# Patient Record
Sex: FEMALE | Race: OTHER | Employment: UNEMPLOYED | URBAN - METROPOLITAN AREA
[De-identification: names, ages, dates, MRNs, and addresses within clinical notes are randomized per-mention and may not be internally consistent; named-entity substitution may affect disease eponyms.]

---

## 2018-03-27 ENCOUNTER — APPOINTMENT (OUTPATIENT)
Dept: GENERAL RADIOLOGY | Age: 48
End: 2018-03-27
Attending: EMERGENCY MEDICINE
Payer: SELF-PAY

## 2018-03-27 ENCOUNTER — APPOINTMENT (OUTPATIENT)
Dept: CT IMAGING | Age: 48
End: 2018-03-27
Attending: EMERGENCY MEDICINE
Payer: SELF-PAY

## 2018-03-27 ENCOUNTER — HOSPITAL ENCOUNTER (EMERGENCY)
Age: 48
Discharge: HOME | End: 2018-03-27
Attending: EMERGENCY MEDICINE
Payer: SELF-PAY

## 2018-03-27 VITALS
HEART RATE: 95 BPM | WEIGHT: 153.88 LBS | OXYGEN SATURATION: 95 % | HEIGHT: 63 IN | RESPIRATION RATE: 14 BRPM | SYSTOLIC BLOOD PRESSURE: 114 MMHG | DIASTOLIC BLOOD PRESSURE: 76 MMHG | TEMPERATURE: 97.8 F | BODY MASS INDEX: 27.27 KG/M2

## 2018-03-27 DIAGNOSIS — R51.9 NONINTRACTABLE HEADACHE, UNSPECIFIED CHRONICITY PATTERN, UNSPECIFIED HEADACHE TYPE: ICD-10-CM

## 2018-03-27 DIAGNOSIS — R10.30 LOWER ABDOMINAL PAIN: ICD-10-CM

## 2018-03-27 DIAGNOSIS — R19.7 DIARRHEA, UNSPECIFIED TYPE: ICD-10-CM

## 2018-03-27 DIAGNOSIS — R11.2 NON-INTRACTABLE VOMITING WITH NAUSEA, UNSPECIFIED VOMITING TYPE: ICD-10-CM

## 2018-03-27 DIAGNOSIS — R07.9 ACUTE CHEST PAIN: Primary | ICD-10-CM

## 2018-03-27 LAB
ALBUMIN SERPL-MCNC: 3.9 G/DL (ref 3.5–5)
ALBUMIN/GLOB SERPL: 1 {RATIO} (ref 1.1–2.2)
ALP SERPL-CCNC: 63 U/L (ref 45–117)
ALT SERPL-CCNC: 39 U/L (ref 12–78)
ANION GAP SERPL CALC-SCNC: 10 MMOL/L (ref 5–15)
APPEARANCE UR: CLEAR
AST SERPL-CCNC: 24 U/L (ref 15–37)
BACTERIA URNS QL MICRO: NEGATIVE /HPF
BASOPHILS # BLD: 0 K/UL (ref 0–0.1)
BASOPHILS NFR BLD: 0 % (ref 0–1)
BILIRUB SERPL-MCNC: 0.7 MG/DL (ref 0.2–1)
BILIRUB UR QL: NEGATIVE
BUN SERPL-MCNC: 13 MG/DL (ref 6–20)
BUN/CREAT SERPL: 16 (ref 12–20)
CALCIUM SERPL-MCNC: 9.1 MG/DL (ref 8.5–10.1)
CHLORIDE SERPL-SCNC: 105 MMOL/L (ref 97–108)
CO2 SERPL-SCNC: 27 MMOL/L (ref 21–32)
COLOR UR: ABNORMAL
CREAT SERPL-MCNC: 0.81 MG/DL (ref 0.55–1.02)
DIFFERENTIAL METHOD BLD: ABNORMAL
EOSINOPHIL # BLD: 0 K/UL (ref 0–0.4)
EOSINOPHIL NFR BLD: 0 % (ref 0–7)
EPITH CASTS URNS QL MICRO: ABNORMAL /LPF
ERYTHROCYTE [DISTWIDTH] IN BLOOD BY AUTOMATED COUNT: 13.4 % (ref 11.5–14.5)
GLOBULIN SER CALC-MCNC: 4.1 G/DL (ref 2–4)
GLUCOSE SERPL-MCNC: 117 MG/DL (ref 65–100)
GLUCOSE UR STRIP.AUTO-MCNC: NEGATIVE MG/DL
HCG UR QL: NEGATIVE
HCT VFR BLD AUTO: 46.1 % (ref 35–47)
HGB BLD-MCNC: 15.8 G/DL (ref 11.5–16)
HGB UR QL STRIP: NEGATIVE
HYALINE CASTS URNS QL MICRO: ABNORMAL /LPF (ref 0–5)
IMM GRANULOCYTES # BLD: 0 K/UL (ref 0–0.04)
IMM GRANULOCYTES NFR BLD AUTO: 0 % (ref 0–0.5)
KETONES UR QL STRIP.AUTO: NEGATIVE MG/DL
LEUKOCYTE ESTERASE UR QL STRIP.AUTO: NEGATIVE
LIPASE SERPL-CCNC: 193 U/L (ref 73–393)
LYMPHOCYTES # BLD: 1.9 K/UL (ref 0.8–3.5)
LYMPHOCYTES NFR BLD: 20 % (ref 12–49)
MCH RBC QN AUTO: 29.2 PG (ref 26–34)
MCHC RBC AUTO-ENTMCNC: 34.3 G/DL (ref 30–36.5)
MCV RBC AUTO: 85.1 FL (ref 80–99)
MONOCYTES # BLD: 0.5 K/UL (ref 0–1)
MONOCYTES NFR BLD: 5 % (ref 5–13)
NEUTS SEG # BLD: 7.2 K/UL (ref 1.8–8)
NEUTS SEG NFR BLD: 74 % (ref 32–75)
NITRITE UR QL STRIP.AUTO: NEGATIVE
NRBC # BLD: 0 K/UL (ref 0–0.01)
NRBC BLD-RTO: 0 PER 100 WBC
PH UR STRIP: 7.5 [PH] (ref 5–8)
PLATELET # BLD AUTO: 345 K/UL (ref 150–400)
PMV BLD AUTO: 8.7 FL (ref 8.9–12.9)
POTASSIUM SERPL-SCNC: 3.7 MMOL/L (ref 3.5–5.1)
PROT SERPL-MCNC: 8 G/DL (ref 6.4–8.2)
PROT UR STRIP-MCNC: NEGATIVE MG/DL
RBC # BLD AUTO: 5.42 M/UL (ref 3.8–5.2)
RBC #/AREA URNS HPF: ABNORMAL /HPF (ref 0–5)
SODIUM SERPL-SCNC: 142 MMOL/L (ref 136–145)
SP GR UR REFRACTOMETRY: 1 (ref 1–1.03)
TROPONIN I SERPL-MCNC: <0.04 NG/ML
UA: UC IF INDICATED,UAUC: ABNORMAL
UROBILINOGEN UR QL STRIP.AUTO: 0.2 EU/DL (ref 0.2–1)
WBC # BLD AUTO: 9.7 K/UL (ref 3.6–11)
WBC URNS QL MICRO: ABNORMAL /HPF (ref 0–4)

## 2018-03-27 PROCEDURE — 85025 COMPLETE CBC W/AUTO DIFF WBC: CPT | Performed by: EMERGENCY MEDICINE

## 2018-03-27 PROCEDURE — 93005 ELECTROCARDIOGRAM TRACING: CPT

## 2018-03-27 PROCEDURE — 84484 ASSAY OF TROPONIN QUANT: CPT | Performed by: EMERGENCY MEDICINE

## 2018-03-27 PROCEDURE — 99284 EMERGENCY DEPT VISIT MOD MDM: CPT

## 2018-03-27 PROCEDURE — 74011636320 HC RX REV CODE- 636/320: Performed by: RADIOLOGY

## 2018-03-27 PROCEDURE — 36415 COLL VENOUS BLD VENIPUNCTURE: CPT | Performed by: EMERGENCY MEDICINE

## 2018-03-27 PROCEDURE — 71046 X-RAY EXAM CHEST 2 VIEWS: CPT

## 2018-03-27 PROCEDURE — 80053 COMPREHEN METABOLIC PANEL: CPT | Performed by: EMERGENCY MEDICINE

## 2018-03-27 PROCEDURE — 71275 CT ANGIOGRAPHY CHEST: CPT

## 2018-03-27 PROCEDURE — 74177 CT ABD & PELVIS W/CONTRAST: CPT

## 2018-03-27 PROCEDURE — 81025 URINE PREGNANCY TEST: CPT

## 2018-03-27 PROCEDURE — 83690 ASSAY OF LIPASE: CPT | Performed by: EMERGENCY MEDICINE

## 2018-03-27 PROCEDURE — 70450 CT HEAD/BRAIN W/O DYE: CPT

## 2018-03-27 PROCEDURE — 81001 URINALYSIS AUTO W/SCOPE: CPT | Performed by: EMERGENCY MEDICINE

## 2018-03-27 RX ADMIN — IOPAMIDOL 100 ML: 755 INJECTION, SOLUTION INTRAVENOUS at 16:43

## 2018-03-27 NOTE — ED PROVIDER NOTES
HPI Comments: 50 y.o. female with no significant past medical history who presents from home with chief complaint of CP. The pt c/o intermittent sharp CP rated at 8/10 that radiates to her back that has persisted over the last 3-4 days. The pt also c/o posterior HA. The pt has also had nausea and has had 2 episodes of vomiting this morning. The pt has been taking tylenol for the pain and has had mild relief. The pt reports that the pain is current in the ED. LMP was 2 months ago. The pt denies hx of HTN, high cholesterol, recent heavy lifting, and dysuria. There are no other acute medical concerns at this time. Significant FMHx: Mother had MI at 79  PCP: None    Note written by Francy Sims, as dictated by Eryn Ortega MD 3:37 PM      The history is provided by the patient. No  was used. No past medical history on file. No past surgical history on file. No family history on file. Social History     Social History    Marital status: SINGLE     Spouse name: N/A    Number of children: N/A    Years of education: N/A     Occupational History    Not on file. Social History Main Topics    Smoking status: Not on file    Smokeless tobacco: Not on file    Alcohol use Not on file    Drug use: Not on file    Sexual activity: Not on file     Other Topics Concern    Not on file     Social History Narrative         ALLERGIES: Review of patient's allergies indicates no known allergies. Review of Systems   Cardiovascular: Positive for chest pain. Gastrointestinal: Positive for nausea and vomiting. Genitourinary: Negative for dysuria. Neurological: Positive for headaches. All other systems reviewed and are negative. Vitals:    03/27/18 1434   BP: 160/87   Pulse: 95   Resp: 15   Temp: 97.8 °F (36.6 °C)   SpO2: 98%   Weight: 69.8 kg (153 lb 14.1 oz)   Height: 5' 3\" (1.6 m)            Physical Exam   Nursing note and vitals reviewed.    Nursing note and vitals reviewed. Constitutional: appears well-developed and well-nourished. No distress. HENT:   Head: Normocephalic and atraumatic. Sclera anicteric  Nose: No rhinorrhea  Mouth/Throat: Oropharynx is clear and moist. Pharynx normal  Eyes: Conjunctivae are normal. Pupils are equal, round, and reactive to light. Right eye exhibits no discharge. Left eye exhibits no discharge. No scleral icterus. Neck: Painless normal range of motion. Supple  Cardiovascular: Normal rate, regular rhythm, normal heart sounds and intact distal pulses. Exam reveals no gallop and no friction rub. No murmur heard. Pulmonary/Chest: Effort normal and breath sounds normal. No respiratory distress. no wheezes. no rales. Abdominal: Soft. Bowel sounds are normal. Exhibits no distension and no mass. mild suprapubic tenderness. No guarding. Musculoskeletal: Normal range of motion. no tenderness. No edema. Mid sternal tenderness  Lymphadenopathy:   No cervical adenopathy. Neurological:  Alert and oriented to person, place, and time. Coordination normal. CN 2-12 intact. Moving all extremities. Skin: Skin is warm and dry. No rash noted. No pallor. Note written by Francy King, as dictated by True Noble MD 4:15 PM        MDM    61-year-old female with multiple complaints including headache, chest pain, nausea vomiting, lower abdominal pain.  Patient denies any urinary complaints.  Neurologically intact. Hernando Giovani tender suprapubically.  Does have reproducible chest pain on the midsternum. Differential diagnosis is broad including intracranial hemorrhage, musculoskeletal chest pain, MI, UTI, intra-abdominal process and others.  Will check chest x-ray, CT head, lab work. ED Course       Procedures   ED EKG interpretation:  Rhythm: normal sinus rhythm; and regular . Rate (approx.): 93; Axis: normal; ST/T wave: no ST/T wave changes;    Note written by Francy King, as dictated by Aidan Whitten MD 4:14 PM    [id number]    Patient's results have been reviewed with them. CP RESOLVED AND PATIENT IS FEELING BETTER. Patient and/or family have verbally conveyed their understanding and agreement of the patient's signs, symptoms, diagnosis, treatment and prognosis and additionally agree to follow up as recommended or return to the Emergency Room should their condition change prior to follow-up. Discharge instructions have also been provided to the patient with some educational information regarding their diagnosis as well a list of reasons why they would want to return to the ER prior to their follow-up appointment should their condition change.     Recent Results (from the past 24 hour(s))   EKG, 12 LEAD, INITIAL    Collection Time: 03/27/18  2:30 PM   Result Value Ref Range    Ventricular Rate 93 BPM    Atrial Rate 93 BPM    P-R Interval 136 ms    QRS Duration 76 ms    Q-T Interval 356 ms    QTC Calculation (Bezet) 442 ms    Calculated P Axis 47 degrees    Calculated R Axis -8 degrees    Calculated T Axis 27 degrees    Diagnosis       Normal sinus rhythm  Low voltage QRS  Borderline ECG  No previous ECGs available     URINALYSIS W/ REFLEX CULTURE    Collection Time: 03/27/18  3:23 PM   Result Value Ref Range    Color YELLOW/STRAW      Appearance CLEAR CLEAR      Specific gravity 1.005 1.003 - 1.030      pH (UA) 7.5 5.0 - 8.0      Protein NEGATIVE  NEG mg/dL    Glucose NEGATIVE  NEG mg/dL    Ketone NEGATIVE  NEG mg/dL    Bilirubin NEGATIVE  NEG      Blood NEGATIVE  NEG      Urobilinogen 0.2 0.2 - 1.0 EU/dL    Nitrites NEGATIVE  NEG      Leukocyte Esterase NEGATIVE  NEG      WBC 0-4 0 - 4 /hpf    RBC 0-5 0 - 5 /hpf    Epithelial cells MODERATE (A) FEW /lpf    Bacteria NEGATIVE  NEG /hpf    UA:UC IF INDICATED CULTURE NOT INDICATED BY UA RESULT CNI      Hyaline cast 0-2 0 - 5 /lpf   CBC WITH AUTOMATED DIFF    Collection Time: 03/27/18  4:33 PM   Result Value Ref Range    WBC 9.7 3.6 - 11.0 K/uL RBC 5.42 (H) 3.80 - 5.20 M/uL    HGB 15.8 11.5 - 16.0 g/dL    HCT 46.1 35.0 - 47.0 %    MCV 85.1 80.0 - 99.0 FL    MCH 29.2 26.0 - 34.0 PG    MCHC 34.3 30.0 - 36.5 g/dL    RDW 13.4 11.5 - 14.5 %    PLATELET 993 968 - 586 K/uL    MPV 8.7 (L) 8.9 - 12.9 FL    NRBC 0.0 0  WBC    ABSOLUTE NRBC 0.00 0.00 - 0.01 K/uL    NEUTROPHILS 74 32 - 75 %    LYMPHOCYTES 20 12 - 49 %    MONOCYTES 5 5 - 13 %    EOSINOPHILS 0 0 - 7 %    BASOPHILS 0 0 - 1 %    IMMATURE GRANULOCYTES 0 0.0 - 0.5 %    ABS. NEUTROPHILS 7.2 1.8 - 8.0 K/UL    ABS. LYMPHOCYTES 1.9 0.8 - 3.5 K/UL    ABS. MONOCYTES 0.5 0.0 - 1.0 K/UL    ABS. EOSINOPHILS 0.0 0.0 - 0.4 K/UL    ABS. BASOPHILS 0.0 0.0 - 0.1 K/UL    ABS. IMM. GRANS. 0.0 0.00 - 0.04 K/UL    DF AUTOMATED     TROPONIN I    Collection Time: 03/27/18  4:33 PM   Result Value Ref Range    Troponin-I, Qt. <0.04 <7.30 ng/mL   METABOLIC PANEL, COMPREHENSIVE    Collection Time: 03/27/18  4:33 PM   Result Value Ref Range    Sodium 142 136 - 145 mmol/L    Potassium 3.7 3.5 - 5.1 mmol/L    Chloride 105 97 - 108 mmol/L    CO2 27 21 - 32 mmol/L    Anion gap 10 5 - 15 mmol/L    Glucose 117 (H) 65 - 100 mg/dL    BUN 13 6 - 20 MG/DL    Creatinine 0.81 0.55 - 1.02 MG/DL    BUN/Creatinine ratio 16 12 - 20      GFR est AA >60 >60 ml/min/1.73m2    GFR est non-AA >60 >60 ml/min/1.73m2    Calcium 9.1 8.5 - 10.1 MG/DL    Bilirubin, total 0.7 0.2 - 1.0 MG/DL    ALT (SGPT) 39 12 - 78 U/L    AST (SGOT) 24 15 - 37 U/L    Alk.  phosphatase 63 45 - 117 U/L    Protein, total 8.0 6.4 - 8.2 g/dL    Albumin 3.9 3.5 - 5.0 g/dL    Globulin 4.1 (H) 2.0 - 4.0 g/dL    A-G Ratio 1.0 (L) 1.1 - 2.2     LIPASE    Collection Time: 03/27/18  4:33 PM   Result Value Ref Range    Lipase 193 73 - 393 U/L   HCG URINE, QL. - POC    Collection Time: 03/27/18  6:05 PM   Result Value Ref Range    Pregnancy test,urine (POC) NEGATIVE  NEG         Xr Chest Pa Lat    Result Date: 3/27/2018  Exam:  2 view chest Indication: Chest pain, midsternal for the past 3 days. COMPARISON: None PA and lateral views demonstrate normal heart size. There is no acute process in the lung fields. There is a calcified granuloma in the right upper lobe. The osseous structures are unremarkable. Impression: No acute process. Ct Head Wo Cont    Result Date: 3/27/2018  EXAM:  CT HEAD WO CONT INDICATION:   Headache with nausea and vomiting x3 days. COMPARISON: None. CONTRAST:  None. TECHNIQUE: Unenhanced CT of the head was performed using 5 mm images. Brain and bone windows were generated. CT dose reduction was achieved through use of a standardized protocol tailored for this examination and automatic exposure control for dose modulation. FINDINGS: The ventricles and sulci are normal in size, shape and configuration and midline. There is no significant white matter disease. There is no intracranial hemorrhage, extra-axial collection, mass, mass effect or midline shift. The basilar cisterns are open. No acute infarct is identified. The bone windows demonstrate no abnormalities. The visualized portions of the paranasal sinuses and mastoid air cells are clear. IMPRESSION: Normal.    Cta Chest W Or W Wo Cont    Result Date: 3/27/2018  INDICATION: Midsternal chest pain and abdominal pain with nausea, vomiting, and headache x3-4 days. COMPARISON: None TECHNIQUE:  2.5 mm axial images were obtained from the bases to the lung apices after the intravenous administration of 100 cc of Isovue-370. Three-dimensional postprocessing was performed by the technologist with MIP reconstructions. Portal venous imaging was obtained through the abdomen and pelvis with sagittal and coronal reformats. Subsequent portal venous imaging of the abdomen and pelvis was performed with sagittal and coronal reformats. Oral contrast Was not administered CT dose reduction was achieved through use of a standardized protocol tailored for this examination and automatic exposure control for dose modulation. FINDINGS: THYROID: No nodule. MEDIASTINUM: No mass or lymphadenopathy. FLORENCIO: No mass or lymphadenopathy. THORACIC AORTA: No dissection or aneurysm. MAIN PULMONARY ARTERY: No acute pulmonary embolus. TRACHEA/BRONCHI: Patent. ESOPHAGUS: No wall thickening or dilatation. HEART: Normal in size. PLEURA: No effusion or pneumothorax. LUNGS: Calcified granuloma right upper lobe. Otherwise clear. LIVER: Diffusely hypodense with sparing about the gallbladder fossa. No focal lesion or biliary ductal dilation. GALLBLADDER: Unremarkable. SPLEEN: No mass. PANCREAS: No mass or ductal dilatation. ADRENALS: Unremarkable. KIDNEYS: No mass, calculus, or hydronephrosis. STOMACH: Unremarkable. SMALL BOWEL: No dilatation or wall thickening. COLON: No dilatation or wall thickening. APPENDIX: Unremarkable. PERITONEUM: No ascites or pneumoperitoneum. RETROPERITONEUM: No lymphadenopathy or aortic aneurysm. REPRODUCTIVE ORGANS: Uterus and ovaries appear unremarkable. URINARY BLADDER: No mass or calculus. BONES: No destructive bone lesion. ADDITIONAL COMMENTS: N/A     IMPRESSION: 1. No acute cardiopulmonary disease. No acute pulmonary embolus. 2. No acute abnormality of the abdomen or pelvis. 3. Hepatic steatosis and additional incidental findings as above. Ct Abd Pelv W Cont    Result Date: 3/27/2018  INDICATION: Midsternal chest pain and abdominal pain with nausea, vomiting, and headache x3-4 days. COMPARISON: None TECHNIQUE:  2.5 mm axial images were obtained from the bases to the lung apices after the intravenous administration of 100 cc of Isovue-370. Three-dimensional postprocessing was performed by the technologist with MIP reconstructions. Portal venous imaging was obtained through the abdomen and pelvis with sagittal and coronal reformats. Subsequent portal venous imaging of the abdomen and pelvis was performed with sagittal and coronal reformats.  Oral contrast Was not administered CT dose reduction was achieved through use of a standardized protocol tailored for this examination and automatic exposure control for dose modulation. FINDINGS: THYROID: No nodule. MEDIASTINUM: No mass or lymphadenopathy. FLORENCIO: No mass or lymphadenopathy. THORACIC AORTA: No dissection or aneurysm. MAIN PULMONARY ARTERY: No acute pulmonary embolus. TRACHEA/BRONCHI: Patent. ESOPHAGUS: No wall thickening or dilatation. HEART: Normal in size. PLEURA: No effusion or pneumothorax. LUNGS: Calcified granuloma right upper lobe. Otherwise clear. LIVER: Diffusely hypodense with sparing about the gallbladder fossa. No focal lesion or biliary ductal dilation. GALLBLADDER: Unremarkable. SPLEEN: No mass. PANCREAS: No mass or ductal dilatation. ADRENALS: Unremarkable. KIDNEYS: No mass, calculus, or hydronephrosis. STOMACH: Unremarkable. SMALL BOWEL: No dilatation or wall thickening. COLON: No dilatation or wall thickening. APPENDIX: Unremarkable. PERITONEUM: No ascites or pneumoperitoneum. RETROPERITONEUM: No lymphadenopathy or aortic aneurysm. REPRODUCTIVE ORGANS: Uterus and ovaries appear unremarkable. URINARY BLADDER: No mass or calculus. BONES: No destructive bone lesion. ADDITIONAL COMMENTS: N/A     IMPRESSION: 1. No acute cardiopulmonary disease. No acute pulmonary embolus. 2. No acute abnormality of the abdomen or pelvis. 3. Hepatic steatosis and additional incidental findings as above.

## 2018-03-27 NOTE — ED NOTES
Pt discharged by provider. Pt ambulatory off the unit with a steady gait with family and in NAD. Pt declined using a w/c. Home with family who will be driving.

## 2018-03-27 NOTE — ED TRIAGE NOTES
Pt. C/o pain to midsternal chest for the past 3 days. C/o general abdominal pain for the past 4 days with n/v and headache for 3 days.

## 2018-03-27 NOTE — DISCHARGE INSTRUCTIONS
Dolor abdominal: Instrucciones de cuidado - [ Abdominal Pain: Care Instructions ]  Instrucciones de cuidado    El dolor abdominal tiene muchas causas posibles. Algunas de ellas no son graves y mejoran por sí solas en unos días. Otras requieren [place] y [place]. Si vasquez dolor continúa o [place], necesitará francisca nueva revisión y [place] pruebas para determinar qué pasa. Es posible que necesite cirugía para corregir el problema. No ignore nuevos síntomas, [place] fiebre, náuseas y [place], [facility], dolor que [place] o [place]. Podrían ser señales de un problema más grave. Vasquez médico puede haberle recomendado francisca consulta de [employer] las 8 o 12 horas siguientes. Si no se siente mejor, es posible que requiera [place] o [place]. El médico lo douglas revisado minuciosamente, jeffery puede garth problemas más tarde. Si nota algún problema o síntomas nuevos, busque tratamiento médico inmediatamente. La atención de seguimiento es francisca parte clave de vasquez tratamiento y seguridad. Asegúrese de hacer y acudir a todas las citas, y llame a vasquez médico si está teniendo problemas. También es francisca buena idea saber los resultados de los exámenes y mantener francisca lista de los medicamentos que juan j. ¿Cómo puede cuidarse en el hogar? · Descanse hasta que se sienta mejor. · Para prevenir la deshidratación, shola abundantes líquidos, suficientes para que vasquez orina sea de color amarillo isidro o transparente [place] el agua. Elija beber agua y otros líquidos yamil sin cafeína hasta que se sienta mejor. Si tiene [employer], del corazón o del hígado y tiene que Randal's líquidos, hable con vasquez médico antes de aumentar vasquez consumo. · Si tiene [employer], coma alimentos suaves, [place] arroz, pan jose seco o galletas saladas, bananas (plátanos) y puré de Synchari. Trate de comer varias comidas pequeñas al día en lugar de dos o nick grandes.   · Espere hasta 50 horas después de que [employer] síntomas hayan desaparecido antes de comer alimentos condimentados, alcohol y bebidas que contengan cafeína. · No consuma alimentos ricos en grasa. · Evite medicamentos antiinflamatorios [place] aspirina, ibuprofeno (Advil, Motrin) y naproxeno (Aleve). Pueden causar [employer]. Dígale a vasquez médico si está tomando aspirina diariamente debido a otro problema de ken. ¿Cuándo debe pedir ayuda? Llame al 911 en cualquier momento que considere que necesita atención de emergencia. Por ejemplo, llame si:  ? · Se desmayó (perdió el conocimiento). ? · Las heces son de color rojizo o muy sanguinolentas (con christian). ? · Vomita christian o algo parecido a granos de café molido. ? · Tiene dolor abdominal nuevo e intenso. ? Llame a vasquez médico ahora mismo o busque atención médica inmediata si:  ? · Vasquez dolor empeora, sobre todo si se concentra en francisca beto parte del vientre. ? · Vuelve a tener fiebre o tiene fiebre más dale. ? · Seema heces son negruzcas y parecidas al alquitrán o tienen rastros de [place]. ? · Tiene sangrado vaginal inesperado. ? · Tiene síntomas de francisca infección del tracto urinario. Estos podrían incluir:  ¨ Dolor al [place]. ¨ Orinar con más frecuencia que lo habitual.  ¨ Christian en la [place]. ? · [employer] o aturdimiento, o que está a punto de [place]. ?Preste especial atención a los cambios en vasquez ken y asegúrese de comunicarse con vasquez médico si:  ? · No está mejorando después de 1 día (24 horas). ¿Dónde puede encontrar más información en inglés? Nina Creed a [email or web address]/. Saundra Childs K810 en la búsqueda para aprender más acerca de \"Dolor abdominal: Instrucciones de cuidado - [ Abdominal Pain: Care Instructions ]. \"  Revisado: 20 Lavaun Short 2017  Versión del contenido: 11.4  © 8681-0443 Healthwise, Incorporated. Las instrucciones de cuidado fueron adaptadas bajo licencia por [employer] (which disclaims liability or warranty for this information).  Si usted tiene [place] afección médica o sobre estas instrucciones, siempre pregunte a vasquez profesional de ken. [facility]orporated niega toda garantía o responsabilidad por vasquez uso de esta información. Dolor de pecho: Instrucciones de cuidado - [ Chest Pain: Care Instructions ]  Instrucciones de cuidado    El dolor de pecho puede tener muchas causas. Algunas no son graves y mejorarán por sí solas en pocos días. Jennifer algunos tipos de dolor de pecho requieren más pruebas y [place]. Es posible que vasquez médico le haya recomendado francisca visita de seguimiento dentro de las 8 a 12 horas siguientes. Si no mejora, es posible que necesite [address] pruebas o [place]. Aunque vasquez médico le haya dado de dale, es necesario que esté atento a cualquier problema que se presente. El médico le hizo un cuidadoso chequeo, jennifer a veces los problemas pueden aparecer posteriormente. Si tiene nuevos síntomas o éstos no mejoran, obtenga atención médica de inmediato. Si tiene dolor o presión en el pecho que empeora o es diferente y que dura más de 5 minutos, o se desmayó (perdió el conocimiento), llame al 911 o busque otra ayuda de emergencia de inmediato. Acudir a francisca consulta médica es sólo un paso en vasquez tratamiento. Aunque se sienta mejor, todavía deberá hacer lo que vasquez médico le recomiende, [place] asistir a todas las visitas de seguimiento sugeridas y joy los medicamentos exactamente [employer]	[place] fueron indicados. [place] le ayudará a recuperarse y prevenir problemas futuros. ¿Cómo puede cuidarse en el hogar? · Descanse hasta que se sienta mejor. · [place] quyen medicamentos exactamente [place] le fueron recetados. Llame a vasquez médico si mellissa estar teniendo problemas con vasquez medicamento. · No conduzca después de joy un analgésico (medicamento para el dolor) recetado. ¿Cuándo debe pedir ayuda? Llame al 911 si:  ? · Se desmayó (perdió el conocimiento). ? · Tiene graves dificultades para respirar.    ? · Tiene síntomas de un ataque al corazón. Estos podrían incluir:  ¨ Dolor o presión en el pecho, o francisca sensación extraña en el pecho. ¨ Sudoración. ¨ Falta de aire. ¨ Náuseas o vómito. ¨ Dolor, presión o francisca sensación extraña en la espalda, el andrew, la mandíbula, la parte superior del abdomen o en ariella o ambos hombros o brazos. ¨ Aturdimiento o debilidad repentina. ¨ Latidos del corazón rápidos o irregulares. Después de llamar al 911, es posible que el operador le diga que mastique 1 aspirina para adultos o de 2 a 4 aspirinas de dosis baja. Espere a francisca ambulancia. No intente conducir usted mismo. ? Llame hoy a vasquez médico si:  ? · Tiene cualquier dificultad para respirar. ? · El dolor en el pecho empeora. ? · [employer] o aturdimiento, o que está a punto de [place]. ? · No mejora [place] se esperaba. ? · Tiene dolor de pecho nuevo o diferente. ¿Dónde puede encontrar más información en inglés? Ann Marie Perez a [email or web address]/. Escriba A120 en la búsqueda para aprender más acerca de \"Dolor de pecho: Instrucciones de cuidado - [ Chest Pain: Care Instructions ]. \"  Revisado: 20 Aneta Monas 2017  Versión del contenido: 11.4  © 9029-5049 Healthwise, Incorporated. Las instrucciones de cuidado fueron adaptadas bajo licencia por [employer] (which disclaims liability or warranty for this information). Si usted tiene [place] afección médica o sobre estas instrucciones, siempre pregunte a vasquez profesional de ken. Healthwise, Incorporated niega toda garantía o responsabilidad por vasquez uso de esta información. Diarrea: Instrucciones de cuidado - [ Diarrhea: Care Instructions ]  Instrucciones de cuidado    La diarrea es la evacuación de heces flojas y acuosas. Con frecuencia, la causa exacta es difícil de determinar.  A veces, la diarrea es la manera que tiene el cuerpo de eliminar lo que le causó malestar estomacal. Los virus, la intoxicación por alimentos y muchos medicamentos pueden provocar diarrea. Algunas personas tienen diarrea [place] respuesta al estrés emocional, la ansiedad o determinados alimentos. [place] todos tenemos diarrea de vez en cuando. Por lo general, no es grave y las heces regresarán pronto a la normalidad. Lo importante es que debe reponer los líquidos perdidos para que pueda prevenir la deshidratación. El médico lo douglas revisado minuciosamente, jeffery se pueden presentar problemas más tarde. Si nota algún problema o síntomas nuevos, busque tratamiento médico inmediatamente. La atención de seguimiento es francisca parte clave de vasquez tratamiento y seguridad. Asegúrese de hacer y acudir a todas las citas, y llame a vasquez médico si está teniendo problemas. También es francisca buena idea saber los resultados de los exámenes y mantener francisca lista de los medicamentos que juan j. ¿Cómo puede cuidarse en el hogar? · Esté atento a señales de deshidratación, lo que significa que vasquez cuerpo douglas perdido [facility]. La deshidratación es un problema médico grave y debe tratarse de inmediato. Las señales de la deshidratación son:  Lindle Jose de sed y sequedad de la boca y los ojos. ¨ Sensación de Michael Sachs o aturdimiento. ¨ Orina más oscura y en kim cantidad de lo normal.  · Para prevenir la deshidratación, shola abundantes líquidos, los suficientes para que vasquez orina sea de color amarillo isidro o transparente [place] el agua. Elija beber agua y otros líquidos yamil sin cafeína hasta que se sienta mejor. Si tiene [employer], del corazón o del hígado y tiene que Hooks's líquidos, hable con vasquez médico antes de aumentar vasquez consumo. · Comience comiendo cantidades pequeñas de alimentos suaves al día siguiente, si tiene ganas. ¨ Pruebe con yogur que tenga cultivos vivos de lactobacilos. (Ani la etiqueta). ¨ Evite las comidas muy condimentadas, las frutas, el alcohol y la cafeína hasta 50 horas después de que desaparezcan todos los síntomas.   ¨ Evite los chicles que contengan sorbitol. ¨ Evite los productos lácteos (excepto el yogur con lactobacilos) mientras tenga diarrea y eunice 3 días después de que hayan desaparecido los síntomas. · El médico podría recomendarle que tome medicamentos de venta william, [place] loperamida (Imodium), si persiste la diarrea después de 6 horas. Ani y siga todas las instrucciones de la [place]. No use zackary medicamento si tiene diarrea sanguinolenta (con christian), fiebre dale u otras señales de enfermedad grave. Llame a vasquez médico si mellissa estar teniendo problemas con vasquez medicamento. ¿Cuándo debe pedir ayuda? Llame al 911 en cualquier momento que considere que necesita atención de [place]. Por ejemplo, llame si:  ? · Se desmayó (perdió el conocimiento). ? · Las heces son de color rojizo o muy sanguinolentas (con christian). ?Llame a vasquez médico ahora mismo o busque atención médica inmediata si:  ? · Siente mareo o aturdimiento, o siente que se va a desmayar. ? · Seema heces son negruzcas y parecidas al alquitrán o tienen rastros de [facility] Hospital[facility]на. ? · Tiene dolor abdominal nuevo o peor. ? · Tiene síntomas de deshidratación, tales [place]:  Evelyn Parisian y ojos secos. ¨ [place] orina de color oscuro. ¨ Tener más sed de lo normal.   ? · Tiene fiebre nueva o más dale. ?Preste especial atención a los cambios en vasquez ken y asegúrese de comunicarse con vasquez médico si:  ? · La diarrea está empeorando. ? · Ve pus en la diarrea. ? · No está mejorando después de 2 días (48 horas). ¿Dónde puede encontrar más información en inglés? Laureano Cardona a [email or web address]/. Karen Thomason A464 en la búsqueda para aprender Evalene Ramsey de \"Diarrea: Instrucciones de cuidado - [ Diarrhea: Care Instructions ]. \"  Revisado: [facility] [place] [facility] [facility]  Versión del contenido: 11.4  © 9272-2857 Healthwise, [employer]. Las instrucciones de cuidado fueron adaptadas bajo licencia por [employer] (which disclaims liability or warranty for this information).  Si usted tiene Juan R's francisca afección médica o sobre estas instrucciones, siempre pregunte a vasquez profesional de ken. [facility] niega toda garantía o responsabilidad por vasquez uso de esta información. Náuseas y vómito: Instrucciones de cuidado - [ Nausea and Vomiting: Care Instructions ]  Instrucciones de cuidado    Cuando tiene náuseas, podría sentirse débil y sudoroso y notar mucha saliva en vasquez boca. Las náuseas suelen [employer]. La mayoría de las veces no hay que preocuparse por las náuseas y [address] [facility], jeffery estos pueden ser signos de [facility]. Dos causas comunes de náuseas y vómito son la gastroenteritis viral y la intoxicación por alimentos. Las náuseas y el vómito por gastroenteritis viral, por lo general, empiezan a mejorar en unas 24 horas. Las náuseas y el vómito debido a intoxicación por alimentos podrían durar de 12 a 48 horas. El médico lo ha revisado minuciosamente, jeffery puede desarrollar problemas más tarde. Si nota algún problema o síntomas nuevos, busque tratamiento médico inmediatamente. La atención de seguimiento es francisca parte clave de vasquez tratamiento y seguridad. Asegúrese de hacer y acudir a todas las citas, y llame a vasquez médico si está teniendo problemas. También es francisca buena idea saber los resultados de los exámenes y mantener francisca lista de los medicamentos que juan j. ¿Cómo puede cuidarse en el hogar? · Para prevenir la deshidratación, shola abundantes líquidos, suficientes para que vasquez orina sea de color amarillo isidro o michael [place] el agua. Opte por beber agua y otros líquidos yamil sin cafeína hasta que se sienta mejor. Si tiene francisca enfermedad del riñón, del corazón o del hígado y tiene que Randal's líquidos, hable con vasquez médico antes de aumentar vasquez consumo. · Permanezca en la cama hasta que se sienta mejor.   · Cuando pueda comer, empiece a consumir sopas claras (caldos), alimentos suaves y líquidos [employer] todos los síntomas hayan desaparecido por un período de 12 a 50 horas. Otras elecciones buenas incluyen pan jose seco, galletas saladas, cereal cocido y postre de gelatina, [place] Jell-O.  ¿Cuándo debe pedir ayuda? Llame al 911 toda vez que piense que puede necesitar atención de emergencia. Por ejemplo, llame si:  ? · Se desmayó (perdió el conocimiento). ?Llame a vasquez médico ahora mismo o busque atención médica inmediata si:  ? · Tiene síntomas de deshidratación, tales [place]:  ¨ Ojos secos y boca seca. ¨ Orina solo poca cantidad de color oscuro. ¨ Tiene más sed de lo normal.   ? · Tiene dolor abdominal nuevo o que empeora. ? · Tiene fiebre nueva o que aumenta. ? · Vomita christian o algo parecido a granos de café molido. ?Preste especial atención a los cambios en vasquez ken y asegúrese de comunicarse con vasquez médico si:  ? · Tiene náusea y vómito continuos. ? · El vómito está empeorando. ? · El vómito dura más de 2 días. ? · No mejora [place] se esperaba. ¿Dónde puede encontrar más información en inglés? Juanise Quiver a [email or web address]/. Martir Kemp H591 en la búsqueda para aprender más acerca de \"Náuseas y vómito: Instrucciones de cuidado - [ Nausea and Vomiting: Care Instructions ]. \"  Revisado: 20 Alex Ness 2017  Versión del contenido: 11.4  © 2782-9830 Healthwise, [employer]. Las instrucciones de cuidado fueron adaptadas bajo licencia por [employer] (which disclaims liability or warranty for this information). Si usted tiene [place] afección médica o sobre estas instrucciones, siempre pregunte a vasquez profesional de ken. [employer] niega toda garantía o responsabilidad por vasquez uso de esta información.

## 2018-03-28 LAB
ATRIAL RATE: 93 BPM
CALCULATED P AXIS, ECG09: 47 DEGREES
CALCULATED R AXIS, ECG10: -8 DEGREES
CALCULATED T AXIS, ECG11: 27 DEGREES
DIAGNOSIS, 93000: NORMAL
P-R INTERVAL, ECG05: 136 MS
Q-T INTERVAL, ECG07: 356 MS
QRS DURATION, ECG06: 76 MS
QTC CALCULATION (BEZET), ECG08: 442 MS
VENTRICULAR RATE, ECG03: 93 BPM